# Patient Record
Sex: MALE
[De-identification: names, ages, dates, MRNs, and addresses within clinical notes are randomized per-mention and may not be internally consistent; named-entity substitution may affect disease eponyms.]

---

## 2018-09-17 ENCOUNTER — HOSPITAL ENCOUNTER (EMERGENCY)
Dept: HOSPITAL 97 - ER | Age: 18
Discharge: HOME | End: 2018-09-17
Payer: SELF-PAY

## 2018-09-17 DIAGNOSIS — R07.9: Primary | ICD-10-CM

## 2018-09-17 LAB
ALBUMIN SERPL BCP-MCNC: 4.1 G/DL (ref 3.4–5)
ALP SERPL-CCNC: 115 U/L (ref 45–117)
ALT SERPL W P-5'-P-CCNC: 21 U/L (ref 12–78)
AST SERPL W P-5'-P-CCNC: 18 U/L (ref 15–37)
BUN BLD-MCNC: 15 MG/DL (ref 7–18)
CKMB CREATINE KINASE MB: < 1 NG/ML (ref 0.3–3.6)
GLUCOSE SERPLBLD-MCNC: 116 MG/DL (ref 74–106)
HCT VFR BLD CALC: 43.2 % (ref 39.6–49)
LYMPHOCYTES # SPEC AUTO: 2 K/UL (ref 0.7–4.9)
MAGNESIUM SERPL-MCNC: 2.1 MG/DL (ref 1.8–2.4)
MCH RBC QN AUTO: 31.1 PG (ref 27–35)
MCV RBC: 90.1 FL (ref 80–100)
METHAMPHET UR QL SCN: NEGATIVE
PMV BLD: 7.5 FL (ref 7.6–11.3)
POTASSIUM SERPL-SCNC: 3.6 MMOL/L (ref 3.5–5.1)
RBC # BLD: 4.79 M/UL (ref 4.33–5.43)
THC SERPL-MCNC: NEGATIVE NG/ML
TROPONIN (EMERG DEPT USE ONLY): < 0.02 NG/ML (ref 0–0.04)

## 2018-09-17 PROCEDURE — 70450 CT HEAD/BRAIN W/O DYE: CPT

## 2018-09-17 PROCEDURE — 99285 EMERGENCY DEPT VISIT HI MDM: CPT

## 2018-09-17 PROCEDURE — 71045 X-RAY EXAM CHEST 1 VIEW: CPT

## 2018-09-17 PROCEDURE — 82553 CREATINE MB FRACTION: CPT

## 2018-09-17 PROCEDURE — 85025 COMPLETE CBC W/AUTO DIFF WBC: CPT

## 2018-09-17 PROCEDURE — 80048 BASIC METABOLIC PNL TOTAL CA: CPT

## 2018-09-17 PROCEDURE — 82550 ASSAY OF CK (CPK): CPT

## 2018-09-17 PROCEDURE — 83735 ASSAY OF MAGNESIUM: CPT

## 2018-09-17 PROCEDURE — 93005 ELECTROCARDIOGRAM TRACING: CPT

## 2018-09-17 PROCEDURE — 81003 URINALYSIS AUTO W/O SCOPE: CPT

## 2018-09-17 PROCEDURE — 96360 HYDRATION IV INFUSION INIT: CPT

## 2018-09-17 PROCEDURE — 84484 ASSAY OF TROPONIN QUANT: CPT

## 2018-09-17 PROCEDURE — 80076 HEPATIC FUNCTION PANEL: CPT

## 2018-09-17 PROCEDURE — 85379 FIBRIN DEGRADATION QUANT: CPT

## 2018-09-17 PROCEDURE — 36415 COLL VENOUS BLD VENIPUNCTURE: CPT

## 2018-09-17 PROCEDURE — 80307 DRUG TEST PRSMV CHEM ANLYZR: CPT

## 2018-09-17 NOTE — ER
Nurse's Notes                                                                                     

 [facility]                                                                

Name: Pantera Lewis                                                                               

Age: 18 yrs                                                                                       

Sex: Male                                                                                         

: 2000                                                                                   

MRN: [id number]                                                                                   

Arrival Date: 2018                                                                          

Time: 15:44                                                                                       

Account#: [id number]                                                                            

Bed 3                                                                                             

Private MD:                                                                                       

Diagnosis: Chest pain, unspecified                                                                

                                                                                                  

Presentation:                                                                                     

                                                                                             

15:36 Presenting complaint: EMS states: midsternal chest pain that started this morning and   sv  

      took some Advil before going to work. While at work outside the pain came back and is       

      reproducible. /67 HR-118 ST, RR-18, BS-146, 20 G R AC and  mg PO given. Pt     

      was lethargic on scene. Transition of care: patient was not received from another           

      setting of care. Onset of symptoms was 2018. Risk Assessment: Do you want     

      to hurt yourself or someone else? Patient reports no desire to harm self or others.         

      Initial Sepsis Screen: Does the patient meet any 2 criteria? No. Patient's initial          

      sepsis screen is negative. Does the patient have a suspected source of infection? No.       

      Patient's initial sepsis screen is negative. Care prior to arrival: Medication(s)           

      given: ASA, 81 mg, x 4, IV initiated. 20 GA, in the right antecubital area, Glucose         

      check: 146 Oxygen administered. via nasal cannula.                                          

15:36 Method Of Arrival: EMS: [facility]  

15:36 Acuity: CHA 3                                                                           sv  

                                                                                                  

Triage Assessment:                                                                                

15:49 General: Appears in no apparent distress. uncomfortable, well developed, Behavior is    sv  

      cooperative, drowsy. Pain: Complains of pain in mid-sternal area Pain currently is 5        

      out of 10 on a pain scale. Pain began this morning Is intermittent. EENT: No signs          

      and/or symptoms were reported regarding the EENT system. Neuro: Level of Consciousness      

      is awake, alert, obeys commands, Oriented to person, place, time, situation, Moves all      

      extremities. Full function Speech is normal. Cardiovascular: Patient's skin is warm and     

      dry. Rhythm is sinus tachycardia. Respiratory: Airway is patent Respiratory effort is       

      even, unlabored, Respiratory pattern is symmetrical, tachypnea. Derm: Skin is normal.       

      Musculoskeletal: No signs and/or symptoms reported regarding the musculoskeletal system.    

                                                                                                  

Historical:                                                                                       

- Allergies:                                                                                      

15:47 No Known Allergies;                                                                     sv  

- Home Meds:                                                                                      

15:47 None [Active];                                                                          sv  

- PMHx:                                                                                           

15:47 None;                                                                                   sv  

- PSHx:                                                                                           

15:47 None;                                                                                   sv  

                                                                                                  

- Immunization history:: Adult Immunizations up to date.                                          

- Social history:: Smoking status: Patient/guardian denies using tobacco.                         

- Ebola Screening: : No symptoms or risks identified at this time.                                

- Family history:: not pertinent.                                                                 

                                                                                                  

                                                                                                  

Screening:                                                                                        

15:48 Abuse screen: Denies threats or abuse. Denies injuries from another. Nutritional        sv  

      screening: No deficits noted. Tuberculosis screening: No symptoms or risk factors           

      identified. Fall Risk None identified.                                                      

                                                                                                  

Assessment:                                                                                       

15:50 Reassessment: Patient appears in no apparent distress at this time. See triage          sv  

      assessment.                                                                                 

16:09 Reassessment: Patient appears in no apparent distress at this time. No changes from     sv  

      previously documented assessment. Patient and/or family updated on plan of care and         

      expected duration. Pain level reassessed. Patient is alert, oriented x 3, equal             

      unlabored respirations, skin warm/dry/pink.                                                 

16:55 Reassessment: Patient appears in no apparent distress at this time. No changes from     sv  

      previously documented assessment. Patient and/or family updated on plan of care and         

      expected duration. Pain level reassessed. Patient is alert, oriented x 3, equal             

      unlabored respirations, skin warm/dry/pink. Dr Lamb in room to do initial               

      assessment.                                                                                 

17:30 Reassessment: Informed Dr Lamb pt is still lethargic. Orders to be put in.          sv  

17:41 Reassessment: Patient appears in no apparent distress at this time. No changes from     sv  

      previously documented assessment. Patient and/or family updated on plan of care and         

      expected duration. Pain level reassessed. Patient is alert, oriented x 3, equal             

      unlabored respirations, skin warm/dry/pink. Called outside lab to add on a D-dimer.         

19:17 Reassessment: Patient appears in no apparent distress at this time. No changes from     sv  

      previously documented assessment. Patient and/or family updated on plan of care and         

      expected duration. Pain level reassessed. Patient is alert, oriented x 3, equal             

      unlabored respirations, skin warm/dry/pink.                                                 

                                                                                                  

Vital Signs:                                                                                      

15:47  / 71; Pulse 115; Resp 24; Temp 98.7; Pulse Ox 99% ; Height 5 ft. 6 in. (167.64   sv  

      cm); Pain 5/10;                                                                             

16:29  / 78; Pulse 110; Resp 15; Pulse Ox 100% on 2 lpm NC;                             sv  

16:55  / 74; Pulse 106; Resp 24; Pulse Ox 100% on 2 lpm NC;                             sv  

17:24  / 74 Supine; Pulse 94;                                                           em1 

17:26  / 69 Sitting; Pulse 105;                                                         em1 

17:28  / 79 Standing; Pulse 115;                                                        em1 

18:13  / 59; Pulse 97; Resp 21; Pulse Ox 98% on 2 lpm NC;                               sv  

                                                                                                  

ED Course:                                                                                        

15:36 Maintain EMS IV. Dressing intact. Good blood return noted. Site clean \T\ dry. Gauge \T\    sv

      site: 20G R AC. Patient maintains SpO2 saturation greater than 95% on room air.             

15:44 Patient arrived in ED.                                                                  sv  

15:44 Carmen Romero, RN is Primary Nurse.                                                  sv  

15:46 Triage completed.                                                                       sv  

15:48 Arm band placed on right wrist.                                                         sv  

15:48 Patient has correct armband on for positive identification. Placed in gown. Bed in low  sv  

      position. Side rails up X2. Cardiac monitor on. Pulse ox on. NIBP on.                       

15:50 Endy Lamb MD is Attending Physician.                                             [facility] 

15:54 EKG done, by EKG tech. reviewed by Endy Lamb MD.                                   sm3 

15:59 Oxygen administration via nasal cannula \T\ 2L/min Response to oxygen therapy: symptoms   sv  

      improved.                                                                                   

16:09 Awaiting ED provider evaluation.                                                        sv  

16:23 XRAY Chest (1 view) In Process Unspecified.                                             EDMS

17:45 CT Head Brain wo Cont In Process Unspecified.                                           EDMS

17:45 CT completed. Patient tolerated procedure well. Patient moved to CT via stretcher.      vr  

      Patient moved back from CT.                                                                 

19:08 No provider procedures requiring assistance completed. IV discontinued, intact,         sv  

      bleeding controlled, No redness/swelling at site. Pressure dressing applied.                

                                                                                                  

Administered Medications:                                                                         

16:00 Drug: NS 0.9% 1000 ml Route: IV; Rate: 1 bolus; Site: right antecubital;                sv  

17:00 Follow up: Response: No adverse reaction; IV Status: Completed infusion; IV Intake:     sv  

      1000ml                                                                                      

                                                                                                  

                                                                                                  

Intake:                                                                                           

17:00 IV: 1000ml; Total: 1000ml.                                                              sv  

                                                                                                  

Outcome:                                                                                          

17:20 Discharge ordered by MD.                                                                neeru 

19:08 Discharged to home ambulatory, with family.                                             sv  

19:08 Condition: stable                                                                           

19:08 Discharge instructions given to patient, family, Instructed on discharge instructions,      

      follow up and referral plans. Demonstrated understanding of instructions, follow-up         

      care.                                                                                       

19:18 Patient left the ED.                                                                    sv  

                                                                                                  

Signatures:                                                                                       

Dispatcher [employer]                           Carmen Miles RN                    RN   sv                                                   

Endy Lamb MD MD cha Martinez, Jacky                               1                                                  

Wayne, Johanna Gee, Kelley                              3                                                  

                                                                                                  

Corrections: (The following items were deleted from the chart)                                    

16:56 16:55  / 74; Pulse 106bpm; Resp 28bpm; Pulse Ox 100% 2 lpm Nasal Cannula; sv      sv  

19:18 19:08 Discharged to home via wheelchair, with family, sv                                sv  

19:18 19:08 Discharge instructions given to patient, family, Instructed on discharge          sv  

      instructions, follow up and referral plans. Demonstrated understanding of instructions,     

      follow-up care, sv                                                                          

                                                                                                  

**************************************************************************************************

## 2018-09-17 NOTE — RAD REPORT
EXAM DESCRIPTION:  CT - Head Brain Wo Cont - 9/17/2018 5:45 pm

 

CLINICAL HISTORY:  WEAKNESS

Drowsiness

 

COMPARISON:  No comparisons

 

TECHNIQUE:  All CT scans are performed using dose optimization technique as appropriate and may inclu
de automated exposure control or mA/KV adjustment according to patient size.

 

FINDINGS:  No intracranial hemorrhage, hydrocephalus or extra-axial fluid collection.Fernandez cisterna ma
gna noted, normal variant.No areas of brain edema or evidence of midline shift.

 

The paranasal sinuses and mastoids are clear. The calvarium is intact.

 

IMPRESSION:  No acute intracranial abnormality.

## 2018-09-17 NOTE — EDPHYS
Physician Documentation                                                                           

 [facility]                                                                

Name: Pantera Lewis                                                                               

Age: 18 yrs                                                                                       

Sex: Male                                                                                         

: 2000                                                                                   

MRN: [id number]                                                                                   

Arrival Date: 2018                                                                          

Time: 15:44                                                                                       

Account#: [id number]                                                                            

Bed 3                                                                                             

Private MD:                                                                                       

ED Physician Endy Lamb                                                                      

HPI:                                                                                              

                                                                                             

17:15 This 18 yrs old  Male presents to ER via EMS with complaints of Chest Pain.     neeru 

17:15 The patient or guardian reports chest pain that is located primarily in the anterior    neeru 

      chest wall, bilaterally. The pain does not radiate. Associated signs and symptoms: The      

      patient has no apparent associated signs or symptoms. The chest pain is described as        

      aching. Duration: The patient or guardian reports multiple episodes, with no pattern.       

      Modifying factors: The symptoms are alleviated by nothing. the symptoms are aggravated      

      by nothing. Severity of pain: At its worst the pain was mild in the emergency               

      department the pain is unchanged. The patient has not experienced similar symptoms in       

      the past.                                                                                   

                                                                                                  

Historical:                                                                                       

- Allergies:                                                                                      

15:47 No Known Allergies;                                                                     sv  

- Home Meds:                                                                                      

15:47 None [Active];                                                                          sv  

- PMHx:                                                                                           

15:47 None;                                                                                   sv  

- PSHx:                                                                                           

15:47 None;                                                                                   sv  

                                                                                                  

- Immunization history:: Adult Immunizations up to date.                                          

- Social history:: Smoking status: Patient/guardian denies using tobacco.                         

- Ebola Screening: : No symptoms or risks identified at this time.                                

- Family history:: not pertinent.                                                                 

                                                                                                  

                                                                                                  

ROS:                                                                                              

17:15 Constitutional: Negative for fever, chills, and weight loss, Eyes: Negative for injury, neeru 

      pain, redness, and discharge, ENT: Negative for injury, pain, and discharge, Neck:          

      Negative for injury, pain, and swelling, Respiratory: Negative for shortness of breath,     

      cough, wheezing, and pleuritic chest pain, Abdomen/GI: Negative for abdominal pain,         

      nausea, vomiting, diarrhea, and constipation, Back: Negative for injury and pain, :       

      Negative for injury, bleeding, discharge, and swelling, MS/Extremity: Negative for          

      injury and deformity, Skin: Negative for injury, rash, and discoloration, Neuro:            

      Negative for headache, weakness, numbness, tingling, and seizure, Psych: Negative for       

      depression, anxiety, suicide ideation, homicidal ideation, and hallucinations,              

      Allergy/Immunology: Negative for hives, rash, and allergies, Endocrine: Negative for        

      neck swelling, polydipsia, polyuria, polyphagia, and marked weight changes,                 

      Hematologic/Lymphatic: Negative for swollen nodes, abnormal bleeding, and unusual           

      bruising.                                                                                   

17:15 Cardiovascular: Positive for chest pain.                                                    

                                                                                                  

Exam:                                                                                             

17:15 Constitutional:  This is a well developed, well nourished patient who is awake, alert,  neeru 

      and in no acute distress. Head/Face:  Normocephalic, atraumatic. Eyes:  Pupils equal        

      round and reactive to light, extra-ocular motions intact.  Lids and lashes normal.          

      Conjunctiva and sclera are non-icteric and not injected.  Cornea within normal limits.      

      Periorbital areas with no swelling, redness, or edema. ENT:  Nares patent. No nasal         

      discharge, no septal abnormalities noted.  Tympanic membranes are normal and external       

      auditory canals are clear.  Oropharynx with no redness, swelling, or masses, exudates,      

      or evidence of obstruction, uvula midline.  Mucous membranes moist. Neck:  Trachea          

      midline, no thyromegaly or masses palpated, and no cervical lymphadenopathy.  Supple,       

      full range of motion without nuchal rigidity, or vertebral point tenderness.  No            

      Meningismus. Chest/axilla:  Normal chest wall appearance and motion.  Nontender with no     

      deformity.  No lesions are appreciated. Cardiovascular:  Regular rate and rhythm with a     

      normal S1 and S2.  No gallops, murmurs, or rubs.  Normal PMI, no JVD.  No pulse             

      deficits. Respiratory:  Lungs have equal breath sounds bilaterally, clear to                

      auscultation and percussion.  No rales, rhonchi or wheezes noted.  No increased work of     

      breathing, no retractions or nasal flaring. Abdomen/GI:  Soft, non-tender, with normal      

      bowel sounds.  No distension or tympany.  No guarding or rebound.  No evidence of           

      tenderness throughout. Back:  No spinal tenderness.  No costovertebral tenderness.          

      Full range of motion. Male :  Normal genitalia with no discharge or lesions. Skin:        

      Warm, dry with normal turgor.  Normal color with no rashes, no lesions, and no evidence     

      of cellulitis. MS/ Extremity:  Pulses equal, no cyanosis.  Neurovascular intact.  Full,     

      normal range of motion. Neuro:  Awake and alert, GCS 15, oriented to person, place,         

      time, and situation.  Cranial nerves II-XII grossly intact.  Motor strength 5/5 in all      

      extremities.  Sensory grossly intact.  Cerebellar exam normal.  Normal gait. Psych:         

      Awake, alert, with orientation to person, place and time.  Behavior, mood, and affect       

      are within normal limits.                                                                   

17:18 Musculoskeletal/extremity: DVT Exam: No signs of deep vein thrombosis. no pain, no      neeru 

      swelling, no tenderness, negative Homans' sign noted on exam, no appreciated bluish         

      discoloration, no erythema, no increased warmth.                                            

                                                                                                  

Vital Signs:                                                                                      

15:47  / 71; Pulse 115; Resp 24; Temp 98.7; Pulse Ox 99% ; Height 5 ft. 6 in. (167.64   sv  

      cm); Pain 5/10;                                                                             

16:29  / 78; Pulse 110; Resp 15; Pulse Ox 100% on 2 lpm NC;                             sv  

16:55  / 74; Pulse 106; Resp 24; Pulse Ox 100% on 2 lpm NC;                             sv  

17:24  / 74 Supine; Pulse 94;                                                           em1 

17:26  / 69 Sitting; Pulse 105;                                                         em1 

17:28  / 79 Standing; Pulse 115;                                                        em1 

18:13  / 59; Pulse 97; Resp 21; Pulse Ox 98% on 2 lpm NC;                               sv  

                                                                                                  

MDM:                                                                                              

15:50 Patient medically screened.                                                             [facility] 

17:15 Data reviewed: vital signs, nurses notes, lab test result(s), EKG, radiologic studies,  neeru 

      plain films.                                                                                

                                                                                                  

                                                                                             

15:48 Order name: UDS; Complete Time: 17:13                                                     

                                                                                             

15:51 Order name: Basic Metabolic Panel; Complete Time: 17:                                 [facility] 

                                                                                             

15:51 Order name: CBC with Diff; Complete Time: 17:                                         [facility] 

                                                                                             

15:51 Order name: Ckmb; Complete Time: 17:13                                                  [facility] 

                                                                                             

15:51 Order name: CPK; Complete Time: 17:13                                                   [facility] 

                                                                                             

15:51 Order name: LFT's; Complete Time: 17:13                                                 [facility] 

                                                                                             

15:48 Order name: EKG; Complete Time: 15:48                                                     

                                                                                             

15:51 Order name: Magnesium; Complete Time: 17:13                                             [facility] 

                                                                                             

15:51 Order name: Troponin (emerg Dept Use Only); Complete Time: 17:13                        [facility] 

                                                                                             

15:51 Order name: XRAY Chest (1 view); Complete Time: 17:                                   [facility] 

                                                                                             

16:35 Order name: Urine Dipstick--Ancillary (enter results); Complete Time: 17:13               

                                                                                             

17:37 Order name: D-Dimer; Complete Time: 19:04                                               [facility] 

                                                                                             

17:37 Order name: CT Head Brain wo Cont; Complete Time: 19:04                                 [facility] 

                                                                                             

15:48 Order name: EKG - Nurse/Tech; Complete Time: 15:48                                      sv  

                                                                                             

15:51 Order name: Cardiac monitoring; Complete Time: 15:59                                    [facility] 

                                                                                             

15:51 Order name: IV Saline Lock; Complete Time: 15:59                                        [facility] 

                                                                                             

15:51 Order name: Labs collected and sent; Complete Time: 15:59                               [facility] 

                                                                                             

15:51 Order name: O2 Per Protocol; Complete Time: 15:59                                       [facility] 

                                                                                             

15:51 Order name: O2 Sat Monitoring; Complete Time: 15:59                                     [facility] 

                                                                                             

15:51 Order name: Urine Dipstick-Ancillary (obtain specimen); Complete Time: 17:21            [facility] 

                                                                                             

17:22 Order name: Orthostatics; Complete Time: 17:39                                          [facility] 

                                                                                                  

Administered Medications:                                                                         

16:00 Drug: NS 0.9% 1000 ml Route: IV; Rate: 1 bolus; Site: right antecubital;                sv  

17:00 Follow up: Response: No adverse reaction; IV Status: Completed infusion; IV Intake:     sv  

      1000ml                                                                                      

                                                                                                  

                                                                                                  

Disposition:                                                                                      

18 17:20 Discharged to Home. Impression: Chest pain, unspecified.                           

- Condition is Stable.                                                                            

- Discharge Instructions: Nonspecific Chest Pain, Nonspecific Chest Pain, Easy-to-Read,           

  Aspirin and Your Heart.                                                                         

                                                                                                  

- Medication Reconciliation Form, Thank You Letter, Antibiotic Education, Prescription            

  Opioid Use, Work release form form.                                                             

- Follow up: Private Physician; When: 2 - 3 days; Reason: Recheck today's complaints,             

  Continuance of care, Re-evaluation by your physician.                                           

- Problem is new.                                                                                 

- Symptoms have improved.                                                                         

                                                                                                  

                                                                                                  

                                                                                                  

Signatures:                                                                                       

Dispatcher [employer]                           Carmen Miles RN RN sv Anderson, Corey, MD MD cha                                                  

                                                                                                  

Corrections: (The following items were deleted from the chart)                                    

19:18 17:20 2018 17:20 Discharged to Home. Impression: Chest pain, unspecified.         sv  

      Condition is Stable. Forms are Medication Reconciliation Form, Thank You Letter,            

      Antibiotic Education, Prescription Opioid Use. Follow up: Private Physician; When: 2 -      

      3 days; Reason: Recheck today's complaints, Continuance of care, Re-evaluation by your      

      physician. Problem is new. Symptoms have improved. neeru                                      

                                                                                                  

**************************************************************************************************

## 2018-09-17 NOTE — RAD REPORT
EXAM DESCRIPTION:  RAD - Chest Single View - 9/17/2018 4:23 pm

 

CLINICAL HISTORY:  Chest pain

 

COMPARISON:  None.

 

TECHNIQUE:  AP portable chest image was obtained 1619 hours .

 

FINDINGS:  Lungs are clear. Heart and vasculature are normal. No measurable pleural effusion and no p
neumothorax. No gross bony abnormality seen. No acute aortic findings suspected.

 

IMPRESSION:  No acute cardiopulmonary process.

## 2020-02-19 NOTE — EKG
Test Date:    2018-09-17               Test Time:    15:40:26

Technician:   BENY                                     

                                                     

MEASUREMENT RESULTS:                                       

Intervals:                                           

Rate:         114                                    

NM:           120                                    

QRSD:         92                                     

QT:           316                                    

QTc:          435                                    

Axis:                                                

P:            65                                     

NM:           120                                    

QRS:          39                                     

T:            40                                     

                                                     

INTERPRETIVE STATEMENTS:                                       

                                                     

Sinus tachycardia

Otherwise normal ECG

No previous ECG available for comparison



Electronically Signed On 09-17-18 19:22:38 CDT by Mono Haney
Normal for race